# Patient Record
Sex: FEMALE | Race: WHITE | NOT HISPANIC OR LATINO | Employment: FULL TIME | URBAN - NONMETROPOLITAN AREA
[De-identification: names, ages, dates, MRNs, and addresses within clinical notes are randomized per-mention and may not be internally consistent; named-entity substitution may affect disease eponyms.]

---

## 2019-11-13 ENCOUNTER — HOSPITAL ENCOUNTER (OUTPATIENT)
Dept: GENERAL RADIOLOGY | Facility: HOSPITAL | Age: 34
Discharge: HOME | End: 2019-11-13

## 2019-12-19 ENCOUNTER — OFFICE VISIT CONVERTED (OUTPATIENT)
Dept: CARDIOLOGY | Facility: CLINIC | Age: 34
End: 2019-12-19
Attending: INTERNAL MEDICINE

## 2019-12-19 ENCOUNTER — CONVERSION ENCOUNTER (OUTPATIENT)
Dept: CARDIOLOGY | Facility: CLINIC | Age: 34
End: 2019-12-19
Attending: INTERNAL MEDICINE

## 2019-12-19 ENCOUNTER — CONVERSION ENCOUNTER (OUTPATIENT)
Dept: CARDIOLOGY | Facility: CLINIC | Age: 34
End: 2019-12-19

## 2021-05-15 VITALS
WEIGHT: 141 LBS | DIASTOLIC BLOOD PRESSURE: 64 MMHG | BODY MASS INDEX: 25.95 KG/M2 | HEART RATE: 82 BPM | SYSTOLIC BLOOD PRESSURE: 108 MMHG | HEIGHT: 62 IN

## 2021-07-20 ENCOUNTER — HOSPITAL ENCOUNTER (OUTPATIENT)
Dept: GENERAL RADIOLOGY | Facility: HOSPITAL | Age: 36
Discharge: HOME | End: 2021-07-20
Admitting: FAMILY MEDICINE

## 2021-07-20 ENCOUNTER — OFFICE VISIT (OUTPATIENT)
Dept: FAMILY MEDICINE CLINIC | Facility: CLINIC | Age: 36
End: 2021-07-20

## 2021-07-20 VITALS
OXYGEN SATURATION: 96 % | WEIGHT: 150.5 LBS | TEMPERATURE: 98 F | HEIGHT: 62 IN | DIASTOLIC BLOOD PRESSURE: 70 MMHG | BODY MASS INDEX: 27.7 KG/M2 | SYSTOLIC BLOOD PRESSURE: 122 MMHG | HEART RATE: 60 BPM

## 2021-07-20 DIAGNOSIS — Z13.1 ENCOUNTER FOR SCREENING EXAMINATION FOR IMPAIRED GLUCOSE REGULATION AND DIABETES MELLITUS: ICD-10-CM

## 2021-07-20 DIAGNOSIS — Z13.220 SCREENING FOR LIPID DISORDERS: ICD-10-CM

## 2021-07-20 DIAGNOSIS — N20.0 KIDNEY STONE: ICD-10-CM

## 2021-07-20 DIAGNOSIS — N20.0 KIDNEY STONE: Primary | ICD-10-CM

## 2021-07-20 LAB
BILIRUB BLD-MCNC: NEGATIVE MG/DL
CLARITY, POC: CLEAR
COLOR UR: YELLOW
GLUCOSE UR STRIP-MCNC: NEGATIVE MG/DL
KETONES UR QL: NEGATIVE
LEUKOCYTE EST, POC: NEGATIVE
NITRITE UR-MCNC: NEGATIVE MG/ML
PH UR: 7 [PH] (ref 5–8)
PROT UR STRIP-MCNC: NEGATIVE MG/DL
RBC # UR STRIP: ABNORMAL /UL
SP GR UR: 1.02 (ref 1–1.03)
UROBILINOGEN UR QL: NORMAL

## 2021-07-20 PROCEDURE — 74018 RADEX ABDOMEN 1 VIEW: CPT

## 2021-07-20 PROCEDURE — 99204 OFFICE O/P NEW MOD 45 MIN: CPT | Performed by: FAMILY MEDICINE

## 2021-07-20 PROCEDURE — 81003 URINALYSIS AUTO W/O SCOPE: CPT | Performed by: FAMILY MEDICINE

## 2021-07-20 RX ORDER — TAMSULOSIN HYDROCHLORIDE 0.4 MG/1
1 CAPSULE ORAL DAILY
Qty: 10 CAPSULE | Refills: 0 | Status: SHIPPED | OUTPATIENT
Start: 2021-07-20 | End: 2021-07-30

## 2021-07-20 RX ORDER — DICLOFENAC SODIUM 75 MG/1
75 TABLET, DELAYED RELEASE ORAL 2 TIMES DAILY PRN
Qty: 30 TABLET | Refills: 1 | Status: SHIPPED | OUTPATIENT
Start: 2021-07-20

## 2021-07-20 RX ORDER — DIPHENOXYLATE HYDROCHLORIDE AND ATROPINE SULFATE 2.5; .025 MG/1; MG/1
TABLET ORAL DAILY
COMMUNITY

## 2021-07-20 NOTE — PROGRESS NOTES
"Chief Complaint  Establish Care (new patient ), xray (back ), labs (routine ), and Back Pain (started 3 weeks ago )    Subjective          Whit Suarez presents to [facility]  History of Present Illness     Patient presents to [facility] has a history of kidney stones feels like she has 1 currently she is having some blood in her urine and pain in her back that radiates down to the left more on the left side and a spasm is there additionally.    She would like some lab work done she not had any since she had her child 3 years ago denies any history of hypertension hyperlipidemia or diabetes but would like screening for those above    Objective   Vital Signs:   /70 (BP Location: Right arm, Patient Position: Sitting, Cuff Size: Adult)   Pulse 60   Temp 98 °F (36.7 °C) (Temporal)   Ht 157.5 cm (62\")   Wt 68.3 kg (150 lb 8 oz)   SpO2 96%   BMI 27.53 kg/m²     Physical Exam  Vitals reviewed.   Constitutional:       Appearance: Normal appearance. She is well-developed.   HENT:      Head: Normocephalic and atraumatic.      Right Ear: External ear normal.      Left Ear: External ear normal.      Mouth/Throat:      Pharynx: No oropharyngeal exudate.   Eyes:      Conjunctiva/sclera: Conjunctivae normal.      Pupils: Pupils are equal, round, and reactive to light.   Cardiovascular:      Rate and Rhythm: Normal rate and regular rhythm.      Heart sounds: No murmur heard.   No friction rub. No gallop.    Pulmonary:      Effort: Pulmonary effort is normal.      Breath sounds: Normal breath sounds. No wheezing or rhonchi.   Abdominal:      General: Bowel sounds are normal. There is no distension.      Palpations: Abdomen is soft.      Tenderness: There is no abdominal tenderness.   Skin:     General: Skin is warm and dry.   Neurological:      Mental Status: She is alert and oriented to person, place, and time.      Cranial Nerves: No cranial nerve deficit.   Psychiatric:  "        Mood and Affect: Mood and affect normal.         Behavior: Behavior normal.         Thought Content: Thought content normal.         Judgment: Judgment normal.        Result Review :   The following data was reviewed by: Ralph Freitas DO on 07/20/2021:                Assessment and Plan    Diagnoses and all orders for this visit:    1. Kidney stone (Primary)  -     POCT urinalysis dipstick, automated  -     diclofenac (VOLTAREN) 75 MG EC tablet; Take 1 tablet by mouth 2 (Two) Times a Day As Needed (kidney stone with food).  Dispense: 30 tablet; Refill: 1  -     tamsulosin (FLOMAX) 0.4 MG capsule 24 hr capsule; Take 1 capsule by mouth Daily for 10 days.  Dispense: 10 capsule; Refill: 0  -     XR Abdomen KUB; Future    2. Screening for lipid disorders  -     Comprehensive Metabolic Panel; Future  -     CBC & Differential; Future  -     Lipid Panel; Future    3. Encounter for screening examination for impaired glucose regulation and diabetes mellitus  -     Comprehensive Metabolic Panel; Future  -     CBC & Differential; Future  -     Lipid Panel; Future        Follow Up   Return in about 1 year (around 7/20/2022), or if symptoms worsen or fail to improve, for Annual physical.  Patient was given instructions and counseling regarding her condition or for health maintenance advice. Please see specific information pulled into the AVS if appropriate.

## 2021-07-21 ENCOUNTER — TELEPHONE (OUTPATIENT)
Dept: FAMILY MEDICINE CLINIC | Facility: CLINIC | Age: 36
End: 2021-07-21

## 2021-07-22 NOTE — PROGRESS NOTES
Sorry for the delay, you do have a small kidney stone its like a millimeter could be causing your symptoms hopefully the medicine is helping and in a pass without problem

## 2021-08-03 NOTE — TELEPHONE ENCOUNTER
I sent the results note through Ralph Delatorre DO   7/22/2021  9:40 AM EDT       Sorry for the delay, you do have a small kidney stone its like a millimeter could be causing your symptoms hopefully the medicine is helping and in a pass without problem

## 2022-03-08 ENCOUNTER — LAB (OUTPATIENT)
Dept: LAB | Facility: HOSPITAL | Age: 37
End: 2022-03-08

## 2022-03-08 DIAGNOSIS — Z13.1 ENCOUNTER FOR SCREENING EXAMINATION FOR IMPAIRED GLUCOSE REGULATION AND DIABETES MELLITUS: ICD-10-CM

## 2022-03-08 DIAGNOSIS — Z13.220 SCREENING FOR LIPID DISORDERS: ICD-10-CM

## 2022-03-08 LAB
ALBUMIN SERPL-MCNC: 5 G/DL (ref 3.5–5.2)
ALBUMIN/GLOB SERPL: 2.3 G/DL
ALP SERPL-CCNC: 34 U/L (ref 39–117)
ALT SERPL W P-5'-P-CCNC: 13 U/L (ref 1–33)
ANION GAP SERPL CALCULATED.3IONS-SCNC: 9.1 MMOL/L (ref 5–15)
AST SERPL-CCNC: 15 U/L (ref 1–32)
BASOPHILS # BLD AUTO: 0.04 10*3/MM3 (ref 0–0.2)
BASOPHILS NFR BLD AUTO: 1 % (ref 0–1.5)
BILIRUB SERPL-MCNC: 0.3 MG/DL (ref 0–1.2)
BUN SERPL-MCNC: 11 MG/DL (ref 6–20)
BUN/CREAT SERPL: 15.9 (ref 7–25)
CALCIUM SPEC-SCNC: 9.5 MG/DL (ref 8.6–10.5)
CHLORIDE SERPL-SCNC: 105 MMOL/L (ref 98–107)
CHOLEST SERPL-MCNC: 176 MG/DL (ref 0–200)
CO2 SERPL-SCNC: 23.9 MMOL/L (ref 22–29)
CREAT SERPL-MCNC: 0.69 MG/DL (ref 0.57–1)
DEPRECATED RDW RBC AUTO: 41.7 FL (ref 37–54)
EGFRCR SERPLBLD CKD-EPI 2021: 114.1 ML/MIN/1.73
EOSINOPHIL # BLD AUTO: 0.06 10*3/MM3 (ref 0–0.4)
EOSINOPHIL NFR BLD AUTO: 1.5 % (ref 0.3–6.2)
ERYTHROCYTE [DISTWIDTH] IN BLOOD BY AUTOMATED COUNT: 12.2 % (ref 12.3–15.4)
GLOBULIN UR ELPH-MCNC: 2.2 GM/DL
GLUCOSE SERPL-MCNC: 99 MG/DL (ref 65–99)
HCT VFR BLD AUTO: 40.6 % (ref 34–46.6)
HDLC SERPL-MCNC: 71 MG/DL (ref 40–60)
HGB BLD-MCNC: 13.9 G/DL (ref 12–15.9)
IMM GRANULOCYTES # BLD AUTO: 0.01 10*3/MM3 (ref 0–0.05)
IMM GRANULOCYTES NFR BLD AUTO: 0.3 % (ref 0–0.5)
LDLC SERPL CALC-MCNC: 94 MG/DL (ref 0–100)
LDLC/HDLC SERPL: 1.33 {RATIO}
LYMPHOCYTES # BLD AUTO: 1.45 10*3/MM3 (ref 0.7–3.1)
LYMPHOCYTES NFR BLD AUTO: 36.4 % (ref 19.6–45.3)
MCH RBC QN AUTO: 31.8 PG (ref 26.6–33)
MCHC RBC AUTO-ENTMCNC: 34.2 G/DL (ref 31.5–35.7)
MCV RBC AUTO: 92.9 FL (ref 79–97)
MONOCYTES # BLD AUTO: 0.34 10*3/MM3 (ref 0.1–0.9)
MONOCYTES NFR BLD AUTO: 8.5 % (ref 5–12)
NEUTROPHILS NFR BLD AUTO: 2.08 10*3/MM3 (ref 1.7–7)
NEUTROPHILS NFR BLD AUTO: 52.3 % (ref 42.7–76)
NRBC BLD AUTO-RTO: 0 /100 WBC (ref 0–0.2)
PLATELET # BLD AUTO: 218 10*3/MM3 (ref 140–450)
PMV BLD AUTO: 10.6 FL (ref 6–12)
POTASSIUM SERPL-SCNC: 4.2 MMOL/L (ref 3.5–5.2)
PROT SERPL-MCNC: 7.2 G/DL (ref 6–8.5)
RBC # BLD AUTO: 4.37 10*6/MM3 (ref 3.77–5.28)
SODIUM SERPL-SCNC: 138 MMOL/L (ref 136–145)
TRIGL SERPL-MCNC: 54 MG/DL (ref 0–150)
VLDLC SERPL-MCNC: 11 MG/DL (ref 5–40)
WBC NRBC COR # BLD: 3.98 10*3/MM3 (ref 3.4–10.8)

## 2022-03-08 PROCEDURE — 80061 LIPID PANEL: CPT | Performed by: FAMILY MEDICINE

## 2022-03-08 PROCEDURE — 36415 COLL VENOUS BLD VENIPUNCTURE: CPT

## 2022-03-08 PROCEDURE — 85025 COMPLETE CBC W/AUTO DIFF WBC: CPT | Performed by: FAMILY MEDICINE

## 2022-03-08 PROCEDURE — 80053 COMPREHEN METABOLIC PANEL: CPT | Performed by: FAMILY MEDICINE

## 2022-03-09 NOTE — PROGRESS NOTES
Labs were good no concerns, we can recheck every year to screen for conditions like diabetes and cholesterol

## 2022-03-15 ENCOUNTER — LAB (OUTPATIENT)
Dept: LAB | Facility: HOSPITAL | Age: 37
End: 2022-03-15

## 2022-03-15 ENCOUNTER — TRANSCRIBE ORDERS (OUTPATIENT)
Dept: LAB | Facility: HOSPITAL | Age: 37
End: 2022-03-15

## 2022-03-15 DIAGNOSIS — R53.83 TIREDNESS: ICD-10-CM

## 2022-03-15 DIAGNOSIS — R53.83 TIREDNESS: Primary | ICD-10-CM

## 2022-03-15 LAB
T4 FREE SERPL-MCNC: 1.32 NG/DL (ref 0.93–1.7)
TSH SERPL DL<=0.05 MIU/L-ACNC: 1.51 UIU/ML (ref 0.27–4.2)

## 2022-03-15 PROCEDURE — 84443 ASSAY THYROID STIM HORMONE: CPT

## 2022-03-15 PROCEDURE — 84439 ASSAY OF FREE THYROXINE: CPT

## 2022-03-15 PROCEDURE — 36415 COLL VENOUS BLD VENIPUNCTURE: CPT

## 2022-10-18 ENCOUNTER — OFFICE VISIT (OUTPATIENT)
Dept: FAMILY MEDICINE CLINIC | Facility: CLINIC | Age: 37
End: 2022-10-18

## 2022-10-18 VITALS
BODY MASS INDEX: 27.58 KG/M2 | RESPIRATION RATE: 18 BRPM | DIASTOLIC BLOOD PRESSURE: 84 MMHG | HEART RATE: 97 BPM | SYSTOLIC BLOOD PRESSURE: 112 MMHG | WEIGHT: 149.9 LBS | OXYGEN SATURATION: 98 % | HEIGHT: 62 IN | TEMPERATURE: 97.2 F

## 2022-10-18 DIAGNOSIS — M77.11 LATERAL EPICONDYLITIS OF RIGHT ELBOW: Primary | ICD-10-CM

## 2022-10-18 PROBLEM — N20.0 KIDNEY STONE: Chronic | Status: ACTIVE | Noted: 2021-07-20

## 2022-10-18 PROBLEM — Z13.1 ENCOUNTER FOR SCREENING EXAMINATION FOR IMPAIRED GLUCOSE REGULATION AND DIABETES MELLITUS: Chronic | Status: ACTIVE | Noted: 2021-07-20

## 2022-10-18 PROCEDURE — 99213 OFFICE O/P EST LOW 20 MIN: CPT | Performed by: FAMILY MEDICINE

## 2022-10-18 RX ORDER — DICLOFENAC POTASSIUM 50 MG/1
TABLET, FILM COATED ORAL
COMMUNITY
Start: 2022-09-21

## 2022-10-18 NOTE — PROGRESS NOTES
"Chief Complaint  Elbow Pain (Right x 1 week )    Subjective        Whit Suarez presents to [facility]  History of Present Illness  She is here today for an acute visit.  She has seasonal allergies.      She says approximately 10 days ago she started having right elbow and forearm pain.  She was diagnosed with tennis elbow and started on diclofenac.  She is also been using ice and a brace but has not been helping much.  She denies trauma to her right arm.    The patient has no other complaints today and denies chest pain, shortness of breath, weakness, numbness, nausea, vomiting, diarrhea, dizziness or syncopal event.        Objective   Vital Signs:  /84 (BP Location: Left arm, Patient Position: Sitting)   Pulse 97   Temp 97.2 °F (36.2 °C)   Resp 18   Ht 157.5 cm (62.01\")   Wt 68 kg (149 lb 14.4 oz)   SpO2 98%   BMI 27.41 kg/m²   Estimated body mass index is 27.41 kg/m² as calculated from the following:    Height as of this encounter: 157.5 cm (62.01\").    Weight as of this encounter: 68 kg (149 lb 14.4 oz).          Physical Exam  Vitals reviewed.   Constitutional:       Appearance: Normal appearance. She is well-developed.   HENT:      Head: Normocephalic and atraumatic.      Right Ear: External ear normal.      Left Ear: External ear normal.      Mouth/Throat:      Pharynx: No oropharyngeal exudate.   Eyes:      Conjunctiva/sclera: Conjunctivae normal.      Pupils: Pupils are equal, round, and reactive to light.   Neck:      Vascular: No carotid bruit.   Cardiovascular:      Rate and Rhythm: Normal rate and regular rhythm.      Heart sounds: No murmur heard.    No friction rub. No gallop.   Pulmonary:      Effort: Pulmonary effort is normal.      Breath sounds: Normal breath sounds. No wheezing or rhonchi.   Abdominal:      General: There is no distension.   Musculoskeletal:      Right elbow: Tenderness present.   Skin:     General: Skin is warm and dry. "   Neurological:      Mental Status: She is alert and oriented to person, place, and time.      Cranial Nerves: No cranial nerve deficit.      Motor: No weakness.   Psychiatric:         Mood and Affect: Mood and affect normal.         Behavior: Behavior normal.         Thought Content: Thought content normal.         Judgment: Judgment normal.        Result Review :    CMP    CMP 3/8/22   Glucose 99   BUN 11   Creatinine 0.69   Sodium 138   Potassium 4.2   Chloride 105   Calcium 9.5   Albumin 5.00   Total Bilirubin 0.3   Alkaline Phosphatase 34 (A)   AST (SGOT) 15   ALT (SGPT) 13   (A) Abnormal value            CBC    CBC 3/8/22   WBC 3.98   RBC 4.37   Hemoglobin 13.9   Hematocrit 40.6   MCV 92.9   MCH 31.8   MCHC 34.2   RDW 12.2 (A)   Platelets 218   (A) Abnormal value            Lipid Panel    Lipid Panel 3/8/22   Total Cholesterol 176   Triglycerides 54   HDL Cholesterol 71 (A)   VLDL Cholesterol 11   LDL Cholesterol  94   LDL/HDL Ratio 1.33   (A) Abnormal value            TSH    TSH 3/15/22   TSH 1.510                     Assessment and Plan   Diagnoses and all orders for this visit:    1. Lateral epicondylitis of right elbow (Primary)  Assessment & Plan:  The patient was educated about carpal tunnel.  She was instructed that moving forward she should take ibuprofen 600 to 800 mg 3 times a day with food to help with inflammation.  She was instructed to use ice periodically to help as well.  She was instructed to do bracing of the right elbow.  She was told to reduce any activities that cause pain or discomfort in the right elbow.  Also she was instructed to read about tennis elbow and home exercises that she can do that may help with her symptoms.  The plan is to follow-up in a couple weeks if she is not better and at that time we will refer to orthopedics for further medical evaluation and management.             Follow Up   No follow-ups on file.  Patient was given instructions and counseling regarding her  condition or for health maintenance advice. Please see specific information pulled into the AVS if appropriate.

## 2022-10-18 NOTE — ASSESSMENT & PLAN NOTE
The patient was educated about carpal tunnel.  She was instructed that moving forward she should take ibuprofen 600 to 800 mg 3 times a day with food to help with inflammation.  She was instructed to use ice periodically to help as well.  She was instructed to do bracing of the right elbow.  She was told to reduce any activities that cause pain or discomfort in the right elbow.  Also she was instructed to read about tennis elbow and home exercises that she can do that may help with her symptoms.  The plan is to follow-up in a couple weeks if she is not better and at that time we will refer to orthopedics for further medical evaluation and management.

## 2023-12-07 ENCOUNTER — HOSPITAL ENCOUNTER (OUTPATIENT)
Dept: GENERAL RADIOLOGY | Facility: HOSPITAL | Age: 38
Discharge: HOME | End: 2023-12-07
Payer: COMMERCIAL

## 2023-12-07 ENCOUNTER — OFFICE VISIT (OUTPATIENT)
Dept: FAMILY MEDICINE CLINIC | Facility: CLINIC | Age: 38
End: 2023-12-07
Payer: COMMERCIAL

## 2023-12-07 ENCOUNTER — LAB (OUTPATIENT)
Dept: LAB | Facility: HOSPITAL | Age: 38
End: 2023-12-07
Payer: COMMERCIAL

## 2023-12-07 VITALS
TEMPERATURE: 98.7 F | RESPIRATION RATE: 16 BRPM | DIASTOLIC BLOOD PRESSURE: 70 MMHG | OXYGEN SATURATION: 96 % | HEIGHT: 62 IN | SYSTOLIC BLOOD PRESSURE: 120 MMHG | BODY MASS INDEX: 28.63 KG/M2 | HEART RATE: 87 BPM | WEIGHT: 155.6 LBS

## 2023-12-07 DIAGNOSIS — Z11.59 NEED FOR HEPATITIS C SCREENING TEST: ICD-10-CM

## 2023-12-07 DIAGNOSIS — M54.12 CERVICAL RADICULOPATHY: ICD-10-CM

## 2023-12-07 DIAGNOSIS — R20.2 TINGLING IN EXTREMITIES: Primary | ICD-10-CM

## 2023-12-07 DIAGNOSIS — Z00.00 ANNUAL PHYSICAL EXAM: ICD-10-CM

## 2023-12-07 LAB
ALBUMIN SERPL-MCNC: 4.7 G/DL (ref 3.5–5.2)
ALBUMIN/GLOB SERPL: 2.6 G/DL
ALP SERPL-CCNC: 37 U/L (ref 39–117)
ALT SERPL W P-5'-P-CCNC: 18 U/L (ref 1–33)
ANION GAP SERPL CALCULATED.3IONS-SCNC: 12.9 MMOL/L (ref 5–15)
AST SERPL-CCNC: 17 U/L (ref 1–32)
BASOPHILS # BLD AUTO: 0.03 10*3/MM3 (ref 0–0.2)
BASOPHILS NFR BLD AUTO: 0.3 % (ref 0–1.5)
BILIRUB SERPL-MCNC: 0.2 MG/DL (ref 0–1.2)
BUN SERPL-MCNC: 17 MG/DL (ref 6–20)
BUN/CREAT SERPL: 18.7 (ref 7–25)
CALCIUM SPEC-SCNC: 9.2 MG/DL (ref 8.6–10.5)
CHLORIDE SERPL-SCNC: 104 MMOL/L (ref 98–107)
CHOLEST SERPL-MCNC: 173 MG/DL (ref 0–200)
CO2 SERPL-SCNC: 21.1 MMOL/L (ref 22–29)
CREAT SERPL-MCNC: 0.91 MG/DL (ref 0.57–1)
DEPRECATED RDW RBC AUTO: 39 FL (ref 37–54)
EGFRCR SERPLBLD CKD-EPI 2021: 82.5 ML/MIN/1.73
EOSINOPHIL # BLD AUTO: 0 10*3/MM3 (ref 0–0.4)
EOSINOPHIL NFR BLD AUTO: 0 % (ref 0.3–6.2)
ERYTHROCYTE [DISTWIDTH] IN BLOOD BY AUTOMATED COUNT: 11.8 % (ref 12.3–15.4)
GLOBULIN UR ELPH-MCNC: 1.8 GM/DL
GLUCOSE SERPL-MCNC: 129 MG/DL (ref 65–99)
HBA1C MFR BLD: 5.6 % (ref 4.8–5.6)
HCT VFR BLD AUTO: 39.4 % (ref 34–46.6)
HCV AB SER DONR QL: NORMAL
HDLC SERPL-MCNC: 75 MG/DL (ref 40–60)
HGB BLD-MCNC: 12.9 G/DL (ref 12–15.9)
IMM GRANULOCYTES # BLD AUTO: 0.06 10*3/MM3 (ref 0–0.05)
IMM GRANULOCYTES NFR BLD AUTO: 0.5 % (ref 0–0.5)
LDLC SERPL CALC-MCNC: 87 MG/DL (ref 0–100)
LDLC/HDLC SERPL: 1.16 {RATIO}
LYMPHOCYTES # BLD AUTO: 0.67 10*3/MM3 (ref 0.7–3.1)
LYMPHOCYTES NFR BLD AUTO: 5.8 % (ref 19.6–45.3)
MCH RBC QN AUTO: 29.8 PG (ref 26.6–33)
MCHC RBC AUTO-ENTMCNC: 32.7 G/DL (ref 31.5–35.7)
MCV RBC AUTO: 91 FL (ref 79–97)
MONOCYTES # BLD AUTO: 0.23 10*3/MM3 (ref 0.1–0.9)
MONOCYTES NFR BLD AUTO: 2 % (ref 5–12)
NEUTROPHILS NFR BLD AUTO: 10.49 10*3/MM3 (ref 1.7–7)
NEUTROPHILS NFR BLD AUTO: 91.4 % (ref 42.7–76)
NRBC BLD AUTO-RTO: 0 /100 WBC (ref 0–0.2)
PLATELET # BLD AUTO: 274 10*3/MM3 (ref 140–450)
PMV BLD AUTO: 10.5 FL (ref 6–12)
POTASSIUM SERPL-SCNC: 4.1 MMOL/L (ref 3.5–5.2)
PROT SERPL-MCNC: 6.5 G/DL (ref 6–8.5)
RBC # BLD AUTO: 4.33 10*6/MM3 (ref 3.77–5.28)
SODIUM SERPL-SCNC: 138 MMOL/L (ref 136–145)
T4 FREE SERPL-MCNC: 1.15 NG/DL (ref 0.93–1.7)
TRIGL SERPL-MCNC: 56 MG/DL (ref 0–150)
TSH SERPL DL<=0.05 MIU/L-ACNC: 0.6 UIU/ML (ref 0.27–4.2)
VLDLC SERPL-MCNC: 11 MG/DL (ref 5–40)
WBC NRBC COR # BLD AUTO: 11.48 10*3/MM3 (ref 3.4–10.8)

## 2023-12-07 PROCEDURE — 72040 X-RAY EXAM NECK SPINE 2-3 VW: CPT

## 2023-12-07 PROCEDURE — 83036 HEMOGLOBIN GLYCOSYLATED A1C: CPT

## 2023-12-07 PROCEDURE — 80061 LIPID PANEL: CPT

## 2023-12-07 PROCEDURE — 84439 ASSAY OF FREE THYROXINE: CPT

## 2023-12-07 PROCEDURE — 36415 COLL VENOUS BLD VENIPUNCTURE: CPT

## 2023-12-07 PROCEDURE — 86803 HEPATITIS C AB TEST: CPT

## 2023-12-07 PROCEDURE — 80050 GENERAL HEALTH PANEL: CPT

## 2023-12-07 RX ORDER — DICLOFENAC SODIUM 75 MG/1
75 TABLET, DELAYED RELEASE ORAL 2 TIMES DAILY PRN
Qty: 60 TABLET | Refills: 2 | Status: SHIPPED | OUTPATIENT
Start: 2023-12-07

## 2023-12-07 NOTE — PROGRESS NOTES
"Chief Complaint  Arm tingles (X 6 months) and Annual Exam    Subjective        Whit Suarez presents to [facility]  History of Present Illness    Patient complaining of arm tingling worse on the left for the last 6 months or longer otherwise healthy denies any known injury trauma used to be more lower part of her arm now seemingly going down from the neck    Objective   Vital Signs:  /70   Pulse 87   Temp 98.7 °F (37.1 °C)   Resp 16   Ht 157.5 cm (62\")   Wt 70.6 kg (155 lb 9.6 oz)   SpO2 96%   BMI 28.46 kg/m²   Estimated body mass index is 28.46 kg/m² as calculated from the following:    Height as of this encounter: 157.5 cm (62\").    Weight as of this encounter: 70.6 kg (155 lb 9.6 oz).               Physical Exam  Vitals reviewed.   Constitutional:       Appearance: Normal appearance. She is well-developed.   HENT:      Head: Normocephalic and atraumatic.      Right Ear: External ear normal.      Left Ear: External ear normal.      Nose: Nose normal.   Eyes:      Conjunctiva/sclera: Conjunctivae normal.      Pupils: Pupils are equal, round, and reactive to light.   Cardiovascular:      Rate and Rhythm: Normal rate.   Pulmonary:      Effort: Pulmonary effort is normal.      Breath sounds: Normal breath sounds.   Abdominal:      General: There is no distension.   Musculoskeletal:      Cervical back: Normal range of motion. No rigidity.   Skin:     General: Skin is warm and dry.   Neurological:      Mental Status: She is alert and oriented to person, place, and time. Mental status is at baseline.   Psychiatric:         Mood and Affect: Mood and affect normal.         Behavior: Behavior normal.         Thought Content: Thought content normal.         Judgment: Judgment normal.        Result Review :  The following data was reviewed by: Ralph Freitas DO on 12/07/2023:  Common labs          12/7/2023    15:09   Common Labs   Glucose 129    BUN 17    Creatinine " 0.91    Sodium 138    Potassium 4.1    Chloride 104    Calcium 9.2    Albumin 4.7    Total Bilirubin 0.2    Alkaline Phosphatase 37    AST (SGOT) 17    ALT (SGPT) 18    WBC 11.48    Hemoglobin 12.9    Hematocrit 39.4    Platelets 274    Total Cholesterol 173    Triglycerides 56    HDL Cholesterol 75    LDL Cholesterol  87    Hemoglobin A1C 5.60                   Assessment and Plan   Diagnoses and all orders for this visit:    1. Tingling in extremities (Primary)    2. Annual physical exam    3. Cervical radiculopathy  -     CBC & Differential; Future  -     Comprehensive metabolic panel; Future  -     Lipid panel; Future  -     Hemoglobin A1c; Future  -     TSH+Free T4; Future  -     XR Spine Cervical 2 or 3 View; Future    4. Need for hepatitis C screening test  -     Hepatitis C antibody; Future    Other orders  -     diclofenac (VOLTAREN) 75 MG EC tablet; Take 1 tablet by mouth 2 (Two) Times a Day As Needed (pain).  Dispense: 60 tablet; Refill: 2        Reviewed health status and screened for age appropriate conditions, will order labs today to manage, screen and follow up at least yearly    Recommended and reviewed age appropriate immunizations cancer screenings today     Recommend wearing sunscreen and following up on any concerning skin conditions or lesions, wearing seat belts and other risk reduction measures to lessen incident of death, disease or other     Counseled on risks, disease and advice given on screening and continued management of health and condition through the next year until next physical     Physical today we will get labs and x-ray of neck to evaluate further imaging if indicated consider physical therapy anti-inflammatories prescribed for pain tingling seems to be more related to radicular symptoms we will further evaluate if needed        Follow Up   No follow-ups on file.  Patient was given instructions and counseling regarding her condition or for health maintenance advice. Please see  specific information pulled into the AVS if appropriate.

## 2024-01-10 ENCOUNTER — OFFICE VISIT (OUTPATIENT)
Dept: INTERNAL MEDICINE | Facility: CLINIC | Age: 39
End: 2024-01-10
Payer: COMMERCIAL

## 2024-01-10 VITALS
HEIGHT: 62 IN | HEART RATE: 97 BPM | OXYGEN SATURATION: 95 % | SYSTOLIC BLOOD PRESSURE: 120 MMHG | WEIGHT: 157 LBS | BODY MASS INDEX: 28.89 KG/M2 | TEMPERATURE: 98.2 F | DIASTOLIC BLOOD PRESSURE: 80 MMHG

## 2024-01-10 DIAGNOSIS — D72.829 LEUKOCYTOSIS, UNSPECIFIED TYPE: ICD-10-CM

## 2024-01-10 DIAGNOSIS — J30.1 ALLERGIC RHINITIS DUE TO POLLEN, UNSPECIFIED SEASONALITY: ICD-10-CM

## 2024-01-10 DIAGNOSIS — Z00.00 ANNUAL PHYSICAL EXAM: Primary | ICD-10-CM

## 2024-01-10 DIAGNOSIS — R73.9 ELEVATED BLOOD SUGAR: ICD-10-CM

## 2024-01-10 NOTE — PROGRESS NOTES
"CHIEF COMPLAINT/ HPI:  Establish Care (New pt establish care. Left arm pain, radiating to the neck. This pain has been going on for several months. Hx of heart issues in family. Pt is fasting for labs. )              Objective   Vital Signs  Vitals:    01/10/24 0938   BP: 120/80   Pulse: 97   Temp: 98.2 °F (36.8 °C)   SpO2: 95%   Weight: 71.2 kg (157 lb)   Height: 157.5 cm (62.01\")      Body mass index is 28.71 kg/m².  Review of Systems   Constitutional: Negative.    HENT: Negative.     Eyes: Negative.    Respiratory: Negative.     Cardiovascular: Negative.    Gastrointestinal: Negative.    Endocrine: Negative.    Genitourinary: Negative.    Musculoskeletal: Negative.    Allergic/Immunologic: Negative.    Neurological: Negative.    Hematological: Negative.    Psychiatric/Behavioral: Negative.        Physical Exam  Constitutional:       General: She is not in acute distress.     Appearance: Normal appearance.   HENT:      Head: Normocephalic.      Mouth/Throat:      Mouth: Mucous membranes are moist.   Eyes:      Conjunctiva/sclera: Conjunctivae normal.      Pupils: Pupils are equal, round, and reactive to light.   Cardiovascular:      Rate and Rhythm: Normal rate and regular rhythm.      Pulses: Normal pulses.      Heart sounds: Normal heart sounds.   Pulmonary:      Effort: Pulmonary effort is normal.      Breath sounds: Normal breath sounds.   Abdominal:      General: Abdomen is flat. Bowel sounds are normal.      Palpations: Abdomen is soft.   Musculoskeletal:         General: No swelling. Normal range of motion.      Cervical back: Neck supple.   Skin:     General: Skin is warm and dry.      Coloration: Skin is not jaundiced.   Neurological:      General: No focal deficit present.      Mental Status: She is alert and oriented to person, place, and time. Mental status is at baseline.   Psychiatric:         Mood and Affect: Mood normal.         Behavior: Behavior normal.         Thought Content: Thought content " "normal.         Judgment: Judgment normal.     Left axilla shows no adenopathy nontender,  Result Review :   No results found for: \"PROBNP\", \"BNP\"  CMP          12/7/2023    15:09   CMP   Glucose 129    BUN 17    Creatinine 0.91    EGFR 82.5    Sodium 138    Potassium 4.1    Chloride 104    Calcium 9.2    Total Protein 6.5    Albumin 4.7    Globulin 1.8    Total Bilirubin 0.2    Alkaline Phosphatase 37    AST (SGOT) 17    ALT (SGPT) 18    Albumin/Globulin Ratio 2.6    BUN/Creatinine Ratio 18.7    Anion Gap 12.9      CBC w/diff          12/7/2023    15:09   CBC w/Diff   WBC 11.48    RBC 4.33    Hemoglobin 12.9    Hematocrit 39.4    MCV 91.0    MCH 29.8    MCHC 32.7    RDW 11.8    Platelets 274    Neutrophil Rel % 91.4    Immature Granulocyte Rel % 0.5    Lymphocyte Rel % 5.8    Monocyte Rel % 2.0    Eosinophil Rel % 0.0    Basophil Rel % 0.3       Lipid Panel          12/7/2023    15:09   Lipid Panel   Total Cholesterol 173    Triglycerides 56    HDL Cholesterol 75    VLDL Cholesterol 11    LDL Cholesterol  87    LDL/HDL Ratio 1.16       Lab Results   Component Value Date    TSH 0.600 12/07/2023    TSH 1.510 03/15/2022      Lab Results   Component Value Date    FREET4 1.15 12/07/2023    FREET4 1.32 03/15/2022      A1C Last 3 Results          12/7/2023    15:09   HGBA1C Last 3 Results   Hemoglobin A1C 5.60                        Visit Diagnoses:    ICD-10-CM ICD-9-CM   1. Annual physical exam  Z00.00 V70.0   2. Elevated blood sugar  R73.9 790.29   3. Allergic rhinitis due to pollen, unspecified seasonality  J30.1 477.0   4. Leukocytosis, unspecified type  D72.829 288.60       Assessment and Plan   Diagnoses and all orders for this visit:    1. Annual physical exam (Primary)  -     XR Chest PA & Lateral; Future  -     MRI Cervical Spine Without Contrast; Future  -     CBC & Differential; Future  -     Sedimentation Rate; Future  -     Ambulatory Referral to Cardiology  -     Adult Stress Echo W/ Cont or Stress Agent if " Necessary Per Protocol; Future    2. Elevated blood sugar  -     XR Chest PA & Lateral; Future  -     MRI Cervical Spine Without Contrast; Future  -     CBC & Differential; Future  -     Sedimentation Rate; Future  -     Ambulatory Referral to Cardiology  -     Adult Stress Echo W/ Cont or Stress Agent if Necessary Per Protocol; Future    3. Allergic rhinitis due to pollen, unspecified seasonality  -     XR Chest PA & Lateral; Future  -     MRI Cervical Spine Without Contrast; Future  -     CBC & Differential; Future  -     Sedimentation Rate; Future  -     Ambulatory Referral to Cardiology  -     Adult Stress Echo W/ Cont or Stress Agent if Necessary Per Protocol; Future    4. Leukocytosis, unspecified type  -     XR Chest PA & Lateral; Future  -     MRI Cervical Spine Without Contrast; Future  -     CBC & Differential; Future  -     Sedimentation Rate; Future  -     Ambulatory Referral to Cardiology  -     Adult Stress Echo W/ Cont or Stress Agent if Necessary Per Protocol; Future        Allergies , cont claritin    Covid vaccine concerns     Left arm discomfort neuropathy, tingling, C-spine x-ray December 7, 2023 unremarkable--- we will do a chest x-ray as a precautionary measure MRI of C-spine has been ordered,    Atypical chest discomfort shortness of breath associated with the left arm pain we will do a stress echo and schedule that also schedule an appointment with Dr. Araujo for second opinion on the symptomatology,     Elevated wbc ? Dec 2023 --will repeat the white blood count, sed rate, follow-up again in 4 weeks    Elevated blood sugar? Dec 2023     Patient should consider getting mammograms started at some point self exams discussed briefly,    Follow Up   Return in about 4 weeks (around 2/7/2024).  Patient was given instructions and counseling regarding her condition or for health maintenance advice. Please see specific information pulled into the AVS if appropriate.

## 2024-01-19 ENCOUNTER — PATIENT ROUNDING (BHMG ONLY) (OUTPATIENT)
Dept: INTERNAL MEDICINE | Facility: CLINIC | Age: 39
End: 2024-01-19
Payer: COMMERCIAL

## 2024-02-13 ENCOUNTER — TELEPHONE (OUTPATIENT)
Dept: INTERNAL MEDICINE | Age: 39
End: 2024-02-13

## 2024-02-13 DIAGNOSIS — F41.9 ANXIETY: Primary | ICD-10-CM

## 2024-02-13 RX ORDER — LORAZEPAM 0.5 MG/1
0.5 TABLET ORAL ONCE
Qty: 1 TABLET | Refills: 0 | Status: SHIPPED | OUTPATIENT
Start: 2024-02-13 | End: 2024-02-13

## 2024-02-13 NOTE — TELEPHONE ENCOUNTER
Caller: Whit Suarez    Relationship: Self    Best call back number: [phone]    What medication are you requesting: MEDICATION TO RELAX PATIENT DURING MRI    What are your current symptoms: N/A    How long have you been experiencing symptoms: N/A    Have you had these symptoms before:    [] Yes  [] No    Have you been treated for these symptoms before:   [] Yes  [] No    If a prescription is needed, what is your preferred pharmacy and phone number: [address] [phone] [facility] [phone]      Additional notes:  PATIENT IS SCHEDULED FOR AN MRI ON FRIDAY 02/16/2024. SHE IS CALLING TO MAKE SURE THIS IS AN OPEN MRI AND NEEDS MEDICATION TO CALM HER DURING THIS PROCEDURE. PATIENT IS CLAUSTROPHOBIC. PLEASE SEND NEW PRESCRIPTION TO PHARMACY ASAP.  PATIENT WOULD LIKE TO RECEIVE A CALL BACK TO VERIFY THE ORDERS ARE IN FOR A OPEN MRI. PLEASE CALL PATIENT BACK AND ADVISE.

## 2024-02-16 ENCOUNTER — HOSPITAL ENCOUNTER (OUTPATIENT)
Dept: CARDIOLOGY | Facility: HOSPITAL | Age: 39
Discharge: HOME | End: 2024-02-16
Payer: COMMERCIAL

## 2024-02-16 ENCOUNTER — HOSPITAL ENCOUNTER (OUTPATIENT)
Dept: MRI IMAGING | Facility: HOSPITAL | Age: 39
Discharge: HOME | End: 2024-02-16
Payer: COMMERCIAL

## 2024-02-16 VITALS — WEIGHT: 150 LBS | BODY MASS INDEX: 27.6 KG/M2 | HEIGHT: 62 IN

## 2024-02-16 DIAGNOSIS — R73.9 ELEVATED BLOOD SUGAR: ICD-10-CM

## 2024-02-16 DIAGNOSIS — D72.829 LEUKOCYTOSIS, UNSPECIFIED TYPE: ICD-10-CM

## 2024-02-16 DIAGNOSIS — J30.1 ALLERGIC RHINITIS DUE TO POLLEN, UNSPECIFIED SEASONALITY: ICD-10-CM

## 2024-02-16 DIAGNOSIS — Z00.00 ANNUAL PHYSICAL EXAM: ICD-10-CM

## 2024-02-16 PROCEDURE — 72141 MRI NECK SPINE W/O DYE: CPT

## 2024-02-22 ENCOUNTER — HOSPITAL ENCOUNTER (OUTPATIENT)
Dept: CARDIOLOGY | Facility: HOSPITAL | Age: 39
Discharge: HOME | End: 2024-02-22
Payer: COMMERCIAL

## 2024-02-22 ENCOUNTER — OFFICE VISIT (OUTPATIENT)
Dept: CARDIOLOGY | Facility: CLINIC | Age: 39
End: 2024-02-22
Payer: COMMERCIAL

## 2024-02-22 VITALS — BODY MASS INDEX: 27.05 KG/M2 | HEIGHT: 62 IN | WEIGHT: 147 LBS

## 2024-02-22 VITALS
SYSTOLIC BLOOD PRESSURE: 102 MMHG | WEIGHT: 154 LBS | HEART RATE: 92 BPM | BODY MASS INDEX: 28.34 KG/M2 | HEIGHT: 62 IN | DIASTOLIC BLOOD PRESSURE: 64 MMHG

## 2024-02-22 DIAGNOSIS — R07.89 CHEST PAIN, ATYPICAL: Primary | ICD-10-CM

## 2024-02-22 LAB
BH CV ECHO MEAS - AO ROOT DIAM: 3 CM
BH CV ECHO MEAS - EDV(CUBED): 83 ML
BH CV ECHO MEAS - EDV(MOD-SP2): 43.4 ML
BH CV ECHO MEAS - EDV(MOD-SP4): 34 ML
BH CV ECHO MEAS - EF(MOD-BP): 55.8 %
BH CV ECHO MEAS - EF(MOD-SP2): 49.3 %
BH CV ECHO MEAS - EF(MOD-SP4): 64.4 %
BH CV ECHO MEAS - ESV(CUBED): 27.3 ML
BH CV ECHO MEAS - ESV(MOD-SP2): 22 ML
BH CV ECHO MEAS - ESV(MOD-SP4): 12.1 ML
BH CV ECHO MEAS - FS: 30.9 %
BH CV ECHO MEAS - IVS/LVPW: 0.83 CM
BH CV ECHO MEAS - IVSD: 0.84 CM
BH CV ECHO MEAS - LA DIMENSION: 2.6 CM
BH CV ECHO MEAS - LAT PEAK E' VEL: 8.7 CM/SEC
BH CV ECHO MEAS - LV DIASTOLIC VOL/BSA (35-75): 20.3 CM2
BH CV ECHO MEAS - LV MASS(C)D: 130.5 GRAMS
BH CV ECHO MEAS - LV SYSTOLIC VOL/BSA (12-30): 7.2 CM2
BH CV ECHO MEAS - LVIDD: 4.4 CM
BH CV ECHO MEAS - LVIDS: 3 CM
BH CV ECHO MEAS - LVPWD: 1.01 CM
BH CV ECHO MEAS - MED PEAK E' VEL: 8.8 CM/SEC
BH CV ECHO MEAS - MV A MAX VEL: 51.4 CM/SEC
BH CV ECHO MEAS - MV DEC SLOPE: 408.7 CM/SEC2
BH CV ECHO MEAS - MV DEC TIME: 0.14 SEC
BH CV ECHO MEAS - MV E MAX VEL: 56.1 CM/SEC
BH CV ECHO MEAS - MV E/A: 1.09
BH CV ECHO MEAS - RVDD: 2.3 CM
BH CV ECHO MEAS - SI(MOD-SP2): 12.8 ML/M2
BH CV ECHO MEAS - SI(MOD-SP4): 13.1 ML/M2
BH CV ECHO MEAS - SV(MOD-SP2): 21.4 ML
BH CV ECHO MEAS - SV(MOD-SP4): 21.9 ML
BH CV ECHO MEASUREMENTS AVERAGE E/E' RATIO: 6.41
BH CV IMMEDIATE POST RECOVERY TECH DATA SYMPTOMS: NORMAL
BH CV IMMEDIATE POST TECH DATA BLOOD PRESSURE: NORMAL MMHG
BH CV IMMEDIATE POST TECH DATA HEART RATE: 130 BPM
BH CV SIX MINUTE RECOVERY TECH DATA BLOOD PRESSURE: NORMAL
BH CV SIX MINUTE RECOVERY TECH DATA HEART RATE: 101 BPM
BH CV SIX MINUTE RECOVERY TECH DATA SYMPTOMS: NORMAL
BH CV STRESS BP STAGE 1: NORMAL
BH CV STRESS BP STAGE 2: NORMAL
BH CV STRESS BP STAGE 3: NORMAL
BH CV STRESS DURATION MIN STAGE 1: 3
BH CV STRESS DURATION MIN STAGE 2: 3
BH CV STRESS DURATION MIN STAGE 3: 3
BH CV STRESS DURATION SEC STAGE 1: 0
BH CV STRESS DURATION SEC STAGE 2: 0
BH CV STRESS DURATION SEC STAGE 3: 0
BH CV STRESS GRADE STAGE 1: 10
BH CV STRESS GRADE STAGE 2: 12
BH CV STRESS GRADE STAGE 3: 14
BH CV STRESS HR STAGE 1: 124
BH CV STRESS HR STAGE 2: 148
BH CV STRESS HR STAGE 3: 182
BH CV STRESS METS STAGE 1: 5
BH CV STRESS METS STAGE 2: 7.5
BH CV STRESS METS STAGE 3: 10
BH CV STRESS O2 STAGE 3: 97
BH CV STRESS PROTOCOL 1: NORMAL
BH CV STRESS RECOVERY BP: NORMAL MMHG
BH CV STRESS RECOVERY HR: 101 BPM
BH CV STRESS SPEED STAGE 1: 1.7
BH CV STRESS SPEED STAGE 2: 2.5
BH CV STRESS SPEED STAGE 3: 3.4
BH CV STRESS STAGE 1: 1
BH CV STRESS STAGE 2: 2
BH CV STRESS STAGE 3: 3
BH CV THREE MINUTE POST TECH DATA BLOOD PRESSURE: NORMAL MMHG
BH CV THREE MINUTE POST TECH DATA HEART RATE: 115 BPM
MAXIMAL PREDICTED HEART RATE: 180 BPM
PERCENT MAX PREDICTED HR: 101.11 %
STRESS BASELINE BP: NORMAL MMHG
STRESS BASELINE HR: 90 BPM
STRESS PERCENT HR: 119 %
STRESS POST ESTIMATED WORKLOAD: 10.2 METS
STRESS POST EXERCISE DUR MIN: 9 MIN
STRESS POST EXERCISE DUR SEC: 0 SEC
STRESS POST O2 SAT PEAK: 97 %
STRESS POST PEAK BP: NORMAL MMHG
STRESS POST PEAK HR: 182 BPM
STRESS TARGET HR: 153 BPM

## 2024-02-22 PROCEDURE — 93017 CV STRESS TEST TRACING ONLY: CPT

## 2024-02-22 PROCEDURE — 93320 DOPPLER ECHO COMPLETE: CPT

## 2024-02-22 PROCEDURE — 93350 STRESS TTE ONLY: CPT

## 2024-02-22 PROCEDURE — 93325 DOPPLER ECHO COLOR FLOW MAPG: CPT

## 2024-02-22 PROCEDURE — 99203 OFFICE O/P NEW LOW 30 MIN: CPT | Performed by: INTERNAL MEDICINE

## 2024-02-22 NOTE — ASSESSMENT & PLAN NOTE
Patient with intermittent arm neck and chest discomfort not exertional in nature and a low 10-year ASCVD score.  She underwent a stress echocardiogram today with good exercise capacity and no ischemic changes overall would most likely favor a muscle skeletal etiology for her chest discomfort issues.  She continue to use Advil or Motrin as needed on a as needed basis for pain relief.

## 2024-02-22 NOTE — PROGRESS NOTES
"Chief Complaint  Chest Pain (SOA)      History of Present Illness  Whit Suarez presents to [facility] CARDIOLOGY  Patient is a 38-year-old female with onset of new left arm pain as well as neck pain issues.  She is also at times had sharp left-sided chest discomfort lasting 2 to 10 minutes the spells occur in isolation as well as combination.  They do not appear to be provoked with any particular activities.  She does get improvement with taking Advil as well as sometimes drinking Powerade.  She has a family history of mitral valve prolapse but no other premature coronary disease noted.  She denies any changes overall in her breathing ability    Past Medical History:   Diagnosis Date    Allergic     Anemia     Headache     Kidney stone     Low back pain     Urinary tract infection          Current Outpatient Medications:     Loratadine (CLARITIN PO), Take  by mouth., Disp: , Rfl:     multivitamin (THERAGRAN) tablet tablet, Take  by mouth Daily., Disp: , Rfl:     Semaglutide-Weight Management 0.25 MG/0.5ML solution auto-injector, Inject 0.5 mL under the skin into the appropriate area as directed 1 (One) Time Per Week., Disp: , Rfl:     There are no discontinued medications.  No Known Allergies     Social History     Tobacco Use    Smoking status: Never    Smokeless tobacco: Never   Vaping Use    Vaping Use: Never used   Substance Use Topics    Alcohol use: Yes     Comment: ocassionally    Drug use: Never       Family History   Problem Relation Age of Onset    Diverticulitis Mother     Diabetes Father     Hypertension Father     Mitral valve prolapse Daughter     Heart murmur Daughter     Cancer Maternal Grandmother     Lung cancer Maternal Grandfather     Lung cancer Paternal Grandfather         Objective     /64 (BP Location: Left arm)   Pulse 92   Ht 157.5 cm (62\")   Wt 69.9 kg (154 lb)   BMI 28.17 kg/m²       Physical Exam    General Appearance:   no acute distress  Alert and " "oriented x3  HENT:   lips not cyanotic  Atraumatic  Neck:  No jvd   supple  Respiratory:  no respiratory distress  normal breath sounds  no rales  Cardiovascular:  Regular rate and rhythm  no S3, no S4   no murmur  no rub  Extremities  No cyanosis  lower extremity edema: none    Skin:   warm, dry  No rashes    Result Review :     No results found for: \"PROBNP\"  CMP          12/7/2023    15:09   CMP   Glucose 129    BUN 17    Creatinine 0.91    EGFR 82.5    Sodium 138    Potassium 4.1    Chloride 104    Calcium 9.2    Total Protein 6.5    Albumin 4.7    Globulin 1.8    Total Bilirubin 0.2    Alkaline Phosphatase 37    AST (SGOT) 17    ALT (SGPT) 18    Albumin/Globulin Ratio 2.6    BUN/Creatinine Ratio 18.7    Anion Gap 12.9      CBC w/diff          12/7/2023    15:09   CBC w/Diff   WBC 11.48    RBC 4.33    Hemoglobin 12.9    Hematocrit 39.4    MCV 91.0    MCH 29.8    MCHC 32.7    RDW 11.8    Platelets 274    Neutrophil Rel % 91.4    Immature Granulocyte Rel % 0.5    Lymphocyte Rel % 5.8    Monocyte Rel % 2.0    Eosinophil Rel % 0.0    Basophil Rel % 0.3       Lab Results   Component Value Date    TSH 0.600 12/07/2023      Lab Results   Component Value Date    FREET4 1.15 12/07/2023      No results found for: \"DDIMERQUANT\"  No results found for: \"MG\"   No results found for: \"DIGOXIN\"   No results found for: \"TROPONINT\"   No results found for: \"POCTROP\"(       Lipid Panel          12/7/2023    15:09   Lipid Panel   Total Cholesterol 173    Triglycerides 56    HDL Cholesterol 75    VLDL Cholesterol 11    LDL Cholesterol  87    LDL/HDL Ratio 1.16      Results for orders placed during the hospital encounter of 02/16/24    Adult Stress Echo W/ Cont or Stress Agent if Necessary Per Protocol    Interpretation Summary    Left ventricular systolic function is normal. Calculated left ventricular EF = 55.8%    Left ventricular diastolic function was normal.    1.  Patient with good exercise capacity and able to achieve greater " than 85% of target heart rate  2.  No clinical symptoms of angina  3.  No EKG evidence of ischemia  4.  No echocardiographic evidence of ischemia overall ejection fraction became more hyperdynamic not a visualize all endomyocardial borders  5.  Low risk stress test         No results found for this or any previous visit.             The 10-year ASCVD risk score (Lucas SARAH, et al., 2019) is: 0.1%    Values used to calculate the score:      Age: 38 years      Sex: Female      Is Non- : No      Diabetic: No      Tobacco smoker: No      Systolic Blood Pressure: 102 mmHg      Is BP treated: No      HDL Cholesterol: 75 mg/dL      Total Cholesterol: 173 mg/dL     Diagnoses and all orders for this visit:    1. Chest pain, atypical (Primary)  Assessment & Plan:  Patient with intermittent arm neck and chest discomfort not exertional in nature and a low 10-year ASCVD score.  She underwent a stress echocardiogram today with good exercise capacity and no ischemic changes overall would most likely favor a muscle skeletal etiology for her chest discomfort issues.  She continue to use Advil or Motrin as needed on a as needed basis for pain relief.              Follow Up     No follow-ups on file.          Patient was given instructions and counseling regarding her condition or for health maintenance advice. Please see specific information pulled into the AVS if appropriate.

## 2024-02-26 ENCOUNTER — HOSPITAL ENCOUNTER (OUTPATIENT)
Dept: GENERAL RADIOLOGY | Facility: HOSPITAL | Age: 39
Discharge: HOME | End: 2024-02-26
Payer: COMMERCIAL

## 2024-02-26 ENCOUNTER — LAB (OUTPATIENT)
Dept: LAB | Facility: HOSPITAL | Age: 39
End: 2024-02-26
Payer: COMMERCIAL

## 2024-02-26 DIAGNOSIS — D72.829 LEUKOCYTOSIS, UNSPECIFIED TYPE: ICD-10-CM

## 2024-02-26 DIAGNOSIS — R73.9 ELEVATED BLOOD SUGAR: ICD-10-CM

## 2024-02-26 DIAGNOSIS — Z00.00 ANNUAL PHYSICAL EXAM: ICD-10-CM

## 2024-02-26 DIAGNOSIS — J30.1 ALLERGIC RHINITIS DUE TO POLLEN, UNSPECIFIED SEASONALITY: ICD-10-CM

## 2024-02-26 LAB
BASOPHILS # BLD AUTO: 0.05 10*3/MM3 (ref 0–0.2)
BASOPHILS NFR BLD AUTO: 1.1 % (ref 0–1.5)
DEPRECATED RDW RBC AUTO: 41 FL (ref 37–54)
EOSINOPHIL # BLD AUTO: 0.08 10*3/MM3 (ref 0–0.4)
EOSINOPHIL NFR BLD AUTO: 1.8 % (ref 0.3–6.2)
ERYTHROCYTE [DISTWIDTH] IN BLOOD BY AUTOMATED COUNT: 12.1 % (ref 12.3–15.4)
ERYTHROCYTE [SEDIMENTATION RATE] IN BLOOD: <1 MM/HR (ref 0–20)
HCT VFR BLD AUTO: 39.9 % (ref 34–46.6)
HGB BLD-MCNC: 13.3 G/DL (ref 12–15.9)
IMM GRANULOCYTES # BLD AUTO: 0.01 10*3/MM3 (ref 0–0.05)
IMM GRANULOCYTES NFR BLD AUTO: 0.2 % (ref 0–0.5)
LYMPHOCYTES # BLD AUTO: 1.46 10*3/MM3 (ref 0.7–3.1)
LYMPHOCYTES NFR BLD AUTO: 33.1 % (ref 19.6–45.3)
MCH RBC QN AUTO: 31.1 PG (ref 26.6–33)
MCHC RBC AUTO-ENTMCNC: 33.3 G/DL (ref 31.5–35.7)
MCV RBC AUTO: 93.2 FL (ref 79–97)
MONOCYTES # BLD AUTO: 0.41 10*3/MM3 (ref 0.1–0.9)
MONOCYTES NFR BLD AUTO: 9.3 % (ref 5–12)
NEUTROPHILS NFR BLD AUTO: 2.4 10*3/MM3 (ref 1.7–7)
NEUTROPHILS NFR BLD AUTO: 54.5 % (ref 42.7–76)
NRBC BLD AUTO-RTO: 0 /100 WBC (ref 0–0.2)
PLATELET # BLD AUTO: 222 10*3/MM3 (ref 140–450)
PMV BLD AUTO: 10.8 FL (ref 6–12)
RBC # BLD AUTO: 4.28 10*6/MM3 (ref 3.77–5.28)
WBC NRBC COR # BLD AUTO: 4.41 10*3/MM3 (ref 3.4–10.8)

## 2024-02-26 PROCEDURE — 36415 COLL VENOUS BLD VENIPUNCTURE: CPT

## 2024-02-26 PROCEDURE — 85652 RBC SED RATE AUTOMATED: CPT

## 2024-02-26 PROCEDURE — 85025 COMPLETE CBC W/AUTO DIFF WBC: CPT

## 2024-02-26 PROCEDURE — 71046 X-RAY EXAM CHEST 2 VIEWS: CPT

## 2024-02-27 ENCOUNTER — OFFICE VISIT (OUTPATIENT)
Dept: INTERNAL MEDICINE | Facility: CLINIC | Age: 39
End: 2024-02-27
Payer: COMMERCIAL

## 2024-02-27 VITALS
HEIGHT: 62 IN | TEMPERATURE: 97.7 F | OXYGEN SATURATION: 97 % | WEIGHT: 153 LBS | BODY MASS INDEX: 28.16 KG/M2 | HEART RATE: 102 BPM

## 2024-02-27 DIAGNOSIS — R73.9 ELEVATED BLOOD SUGAR: ICD-10-CM

## 2024-02-27 DIAGNOSIS — M54.12 CERVICAL RADICULOPATHY: ICD-10-CM

## 2024-02-27 DIAGNOSIS — R73.01 IFG (IMPAIRED FASTING GLUCOSE): ICD-10-CM

## 2024-02-27 DIAGNOSIS — D72.829 LEUKOCYTOSIS, UNSPECIFIED TYPE: ICD-10-CM

## 2024-02-27 DIAGNOSIS — R07.89 CHEST PAIN, ATYPICAL: Primary | ICD-10-CM

## 2024-02-27 PROCEDURE — 99214 OFFICE O/P EST MOD 30 MIN: CPT | Performed by: INTERNAL MEDICINE

## 2024-02-27 NOTE — PROGRESS NOTES
"CHIEF COMPLAINT/ HPI:  Weight Gain (Routine follow up, Lab follow up. Pt is currently on semaglutide out of pocket , asking if she can get a rx.)              Objective   Vital Signs  Vitals:    02/27/24 1219   Pulse: 102   Temp: 97.7 °F (36.5 °C)   SpO2: 97%   Weight: 69.4 kg (153 lb)   Height: 157.5 cm (62.01\")      Body mass index is 27.98 kg/m².  Review of Systems   Constitutional: Negative.    HENT: Negative.     Eyes: Negative.    Respiratory: Negative.     Cardiovascular: Negative.    Gastrointestinal: Negative.    Endocrine: Negative.    Genitourinary: Negative.    Musculoskeletal: Negative.    Allergic/Immunologic: Negative.    Neurological: Negative.    Hematological: Negative.    Psychiatric/Behavioral: Negative.        Physical Exam  Constitutional:       Appearance: Normal appearance.   Cardiovascular:      Rate and Rhythm: Normal rate and regular rhythm.      Pulses: Normal pulses.      Heart sounds: Normal heart sounds.   Pulmonary:      Effort: Pulmonary effort is normal.      Breath sounds: Normal breath sounds.   Musculoskeletal:      Cervical back: Normal range of motion and neck supple.   Neurological:      General: No focal deficit present.      Mental Status: She is alert and oriented to person, place, and time.        Result Review :   No results found for: \"PROBNP\", \"BNP\"  CMP          12/7/2023    15:09   CMP   Glucose 129    BUN 17    Creatinine 0.91    EGFR 82.5    Sodium 138    Potassium 4.1    Chloride 104    Calcium 9.2    Total Protein 6.5    Albumin 4.7    Globulin 1.8    Total Bilirubin 0.2    Alkaline Phosphatase 37    AST (SGOT) 17    ALT (SGPT) 18    Albumin/Globulin Ratio 2.6    BUN/Creatinine Ratio 18.7    Anion Gap 12.9      CBC w/diff          12/7/2023    15:09 2/26/2024    08:40   CBC w/Diff   WBC 11.48  4.41    RBC 4.33  4.28    Hemoglobin 12.9  13.3    Hematocrit 39.4  39.9    MCV 91.0  93.2    MCH 29.8  31.1    MCHC 32.7  33.3    RDW 11.8  12.1    Platelets 274  222  "   Neutrophil Rel % 91.4  54.5    Immature Granulocyte Rel % 0.5  0.2    Lymphocyte Rel % 5.8  33.1    Monocyte Rel % 2.0  9.3    Eosinophil Rel % 0.0  1.8    Basophil Rel % 0.3  1.1       Lipid Panel          12/7/2023    15:09   Lipid Panel   Total Cholesterol 173    Triglycerides 56    HDL Cholesterol 75    VLDL Cholesterol 11    LDL Cholesterol  87    LDL/HDL Ratio 1.16       Lab Results   Component Value Date    TSH 0.600 12/07/2023    TSH 1.510 03/15/2022      Lab Results   Component Value Date    FREET4 1.15 12/07/2023    FREET4 1.32 03/15/2022      A1C Last 3 Results          12/7/2023    15:09   HGBA1C Last 3 Results   Hemoglobin A1C 5.60                        Visit Diagnoses:    ICD-10-CM ICD-9-CM   1. Chest pain, atypical  R07.89 786.59   2. IFG (impaired fasting glucose)  R73.01 790.21   3. Leukocytosis, unspecified type  D72.829 288.60   4. Elevated blood sugar  R73.9 790.29   5. Cervical radiculopathy  M54.12 723.4       Assessment and Plan   Diagnoses and all orders for this visit:    1. Chest pain, atypical (Primary)  -     Comprehensive Metabolic Panel; Future  -     CBC & Differential; Future  -     Hemoglobin A1c; Future    2. IFG (impaired fasting glucose)  -     Comprehensive Metabolic Panel; Future  -     CBC & Differential; Future  -     Hemoglobin A1c; Future    3. Leukocytosis, unspecified type  -     Comprehensive Metabolic Panel; Future  -     CBC & Differential; Future  -     Hemoglobin A1c; Future    4. Elevated blood sugar  -     Comprehensive Metabolic Panel; Future  -     CBC & Differential; Future  -     Hemoglobin A1c; Future    5. Cervical radiculopathy  -     Comprehensive Metabolic Panel; Future  -     CBC & Differential; Future  -     Hemoglobin A1c; Future    Other orders  -     Semaglutide-Weight Management 0.5 MG/0.5ML solution auto-injector; Inject 0.5 mL under the skin into the appropriate area as directed 1 (One) Time Per Week.  Dispense: 2 mL; Refill: 5        Allergies ,  cont claritin    Covid vaccine concerns     Left arm discomfort neuropathy, tingling, C-spine x-ray December 7, 2023 unremarkable--- we will do a chest x-ray as a precautionary measure ,  MRI of C-spine, shows mild C3-4 disc bulge with small central disc protrusion mild central canal stenosis, no high-grade cervical stenosis or significant foraminal stenosis,----discussed neurosurgery opinion ? Feb 27, 2024,     Atypical chest discomfort shortness of breath associated with the left arm pain we will do a stress echo and schedule that also schedule an appointment with Dr. Araujo for second opinion on the symptomatology,-------- chest x-ray February 26, 2024 no active disease-------- exercise stress echo test February 22, 2024--- essentially normal, no EKG evidence of ischemia no echocardiographic evidence of ischemia or wall motion abnormalities normal ejection fraction, patient did follow-up with Dr. Araujo that afternoon after the stress echo,     Elevated wbc ? Dec 2023 --repeat CBC showed normal white count February 26, 2024,    Elevated blood sugar? Dec 2023, hemoglobin A1c 5.6 December 7, 2023    Patient should consider getting mammograms started at some point self exams discussed briefly,                   Follow Up   Return in about 6 months (around 8/27/2024).  Patient was given instructions and counseling regarding her condition or for health maintenance advice. Please see specific information pulled into the AVS if appropriate.         Answers submitted by the patient for this visit:  Other (Submitted on 2/25/2024)  Please describe your symptoms.: Going over test results and next steps.  Have you had these symptoms before?: No  How long have you been having these symptoms?: 1-4 days  Primary Reason for Visit (Submitted on 2/25/2024)  What is the primary reason for your visit?: Other

## 2024-03-05 ENCOUNTER — TELEPHONE (OUTPATIENT)
Dept: INTERNAL MEDICINE | Facility: CLINIC | Age: 39
End: 2024-03-05
Payer: COMMERCIAL

## 2024-03-05 DIAGNOSIS — R73.01 IFG (IMPAIRED FASTING GLUCOSE): Primary | ICD-10-CM

## 2024-03-05 NOTE — TELEPHONE ENCOUNTER
Caller: Whit Suarez    Relationship: Self    Best call back number: [phone]    Requested Prescriptions:   Requested Prescriptions     Pending Prescriptions Disp Refills    Semaglutide-Weight Management 0.5 MG/0.5ML solution auto-injector 2 mL 5     Sig: Inject 0.5 mL under the skin into the appropriate area as directed 1 (One) Time Per Week.        Pharmacy where request should be sent: [address] [phone] [facility] [phone] FX     Last office visit with prescribing clinician: 2/27/2024   Last telemedicine visit with prescribing clinician: Visit date not found   Next office visit with prescribing clinician: 8/27/2024     Additional details provided by patient: PHARMACY DID NOT RECEIVE THIS FOR PATIENT.     Reyes Bird Rep   03/05/24 15:39 EST

## 2024-06-17 ENCOUNTER — TELEPHONE (OUTPATIENT)
Dept: INTERNAL MEDICINE | Age: 39
End: 2024-06-17
Payer: COMMERCIAL

## 2024-06-17 NOTE — TELEPHONE ENCOUNTER
Pt requesting alternative medication to Wegovy due to insurance coverage. Insurance preferred is Zepbound.   Pls advise

## 2024-06-17 NOTE — TELEPHONE ENCOUNTER
[facility] and she can call us in a month if she wants to go up on the dose which she should try to go up on the dose each month if possible

## 2024-06-17 NOTE — TELEPHONE ENCOUNTER
Caller: Whit Suarez    Relationship to patient: Self    Best call back number: [phone]    Patient is needing: PATIENT STATED INSURANCE WILL NOT COVER MEDICATION WEGOVY. SHE IS ASKING IF MD CABAN CAN SWITCH HER TO ZEPBOUND SO INSURANCE WILL COVER MEDICATION.

## 2024-06-17 NOTE — TELEPHONE ENCOUNTER
Called, and no one answered, left a detailed voicemail.    Hub may relay:  Per MD Jerod-Tell patient I sent in zepbound for her to the pharmacy and she can call us in a month if she wants to go up on the dose which she should try to go up on the dose each month if possible

## 2024-06-26 ENCOUNTER — TELEPHONE (OUTPATIENT)
Dept: INTERNAL MEDICINE | Age: 39
End: 2024-06-26

## 2024-06-26 NOTE — TELEPHONE ENCOUNTER
Caller: Whit Suarez    Relationship: Self    Best call back number: [phone]    Which medication are you concerned about:   Tirzepatide-Weight Management (ZEPBOUND) 2.5 MG/0.5ML solution auto-injector     What are your concerns: PATIENT IS WANTING CLARIFICATION ON THE DOSAGE OF THIS MEDICATION. SHE HAS BEEN TAKING 0 . 50 MG OF THE SEMAGLUTIDE FOR ABOUT A MONTH AND A HALF NOW, AND SHE IS WONDERING IF THE DOSAGE OF THE ZEPBOUND THAT WAS SENT WAS ONE SHE NEEDED TO TAKE TO BEGIN TAKING THAT.

## 2024-06-27 NOTE — TELEPHONE ENCOUNTER
Spoke with pharmacy. Patient's insurance does not cover any weight management drugs. It is a plan exclusion.

## 2024-10-17 ENCOUNTER — TRANSCRIBE ORDERS (OUTPATIENT)
Dept: ADMINISTRATIVE | Facility: HOSPITAL | Age: 39
End: 2024-10-17
Payer: COMMERCIAL

## 2024-10-17 DIAGNOSIS — Z12.31 BREAST CANCER SCREENING BY MAMMOGRAM: Primary | ICD-10-CM

## 2024-10-29 PROCEDURE — 88305 TISSUE EXAM BY PATHOLOGIST: CPT | Performed by: OBSTETRICS & GYNECOLOGY

## 2024-10-30 ENCOUNTER — LAB REQUISITION (OUTPATIENT)
Dept: LAB | Facility: HOSPITAL | Age: 39
End: 2024-10-30
Payer: COMMERCIAL

## 2024-10-30 DIAGNOSIS — R87.610 ATYPICAL SQUAMOUS CELLS OF UNDETERMINED SIGNIFICANCE ON CYTOLOGIC SMEAR OF CERVIX (ASC-US): ICD-10-CM

## 2024-10-31 LAB
CYTO UR: NORMAL
LAB AP CASE REPORT: NORMAL
LAB AP CLINICAL INFORMATION: NORMAL
PATH REPORT.FINAL DX SPEC: NORMAL
PATH REPORT.GROSS SPEC: NORMAL

## 2024-12-20 ENCOUNTER — HOSPITAL ENCOUNTER (OUTPATIENT)
Dept: MAMMOGRAPHY | Facility: HOSPITAL | Age: 39
Discharge: HOME | End: 2024-12-20
Admitting: OBSTETRICS & GYNECOLOGY
Payer: COMMERCIAL

## 2024-12-20 DIAGNOSIS — Z12.31 BREAST CANCER SCREENING BY MAMMOGRAM: ICD-10-CM

## 2024-12-20 PROCEDURE — 77063 BREAST TOMOSYNTHESIS BI: CPT

## 2024-12-20 PROCEDURE — 77067 SCR MAMMO BI INCL CAD: CPT

## 2024-12-26 ENCOUNTER — TRANSCRIBE ORDERS (OUTPATIENT)
Dept: ADMINISTRATIVE | Facility: HOSPITAL | Age: 39
End: 2024-12-26
Payer: COMMERCIAL

## 2024-12-27 ENCOUNTER — TRANSCRIBE ORDERS (OUTPATIENT)
Dept: ADMINISTRATIVE | Facility: HOSPITAL | Age: 39
End: 2024-12-27
Payer: COMMERCIAL

## 2024-12-27 DIAGNOSIS — N64.89 BREAST ASYMMETRY: Primary | ICD-10-CM

## 2025-01-02 ENCOUNTER — HOSPITAL ENCOUNTER (OUTPATIENT)
Dept: ULTRASOUND IMAGING | Facility: HOSPITAL | Age: 40
Discharge: HOME | End: 2025-01-02
Payer: COMMERCIAL

## 2025-01-02 ENCOUNTER — HOSPITAL ENCOUNTER (OUTPATIENT)
Dept: MAMMOGRAPHY | Facility: HOSPITAL | Age: 40
Discharge: HOME | End: 2025-01-02
Payer: COMMERCIAL

## 2025-01-02 DIAGNOSIS — N64.89 BREAST ASYMMETRY: ICD-10-CM

## 2025-01-02 PROCEDURE — 76642 ULTRASOUND BREAST LIMITED: CPT

## 2025-01-02 PROCEDURE — 77065 DX MAMMO INCL CAD UNI: CPT

## 2025-01-02 PROCEDURE — G0279 TOMOSYNTHESIS, MAMMO: HCPCS

## 2025-07-02 ENCOUNTER — TRANSCRIBE ORDERS (OUTPATIENT)
Dept: ADMINISTRATIVE | Facility: HOSPITAL | Age: 40
End: 2025-07-02
Payer: COMMERCIAL

## 2025-07-02 DIAGNOSIS — N64.89 OTHER SPECIFIED DISORDERS OF BREAST: Primary | ICD-10-CM
